# Patient Record
Sex: MALE | Race: WHITE | NOT HISPANIC OR LATINO | Employment: FULL TIME | URBAN - NONMETROPOLITAN AREA
[De-identification: names, ages, dates, MRNs, and addresses within clinical notes are randomized per-mention and may not be internally consistent; named-entity substitution may affect disease eponyms.]

---

## 2024-10-03 ENCOUNTER — APPOINTMENT (OUTPATIENT)
Dept: RADIOLOGY | Facility: HOSPITAL | Age: 34
End: 2024-10-03
Payer: COMMERCIAL

## 2024-10-03 ENCOUNTER — HOSPITAL ENCOUNTER (EMERGENCY)
Facility: HOSPITAL | Age: 34
Discharge: HOME | End: 2024-10-03
Payer: COMMERCIAL

## 2024-10-03 VITALS
WEIGHT: 315 LBS | OXYGEN SATURATION: 96 % | TEMPERATURE: 98.5 F | RESPIRATION RATE: 20 BRPM | HEIGHT: 76 IN | HEART RATE: 98 BPM | BODY MASS INDEX: 38.36 KG/M2 | SYSTOLIC BLOOD PRESSURE: 147 MMHG | DIASTOLIC BLOOD PRESSURE: 92 MMHG

## 2024-10-03 DIAGNOSIS — S96.919A MUSCLE STRAIN OF FOOT, INITIAL ENCOUNTER: ICD-10-CM

## 2024-10-03 DIAGNOSIS — S93.402A SPRAIN OF LEFT ANKLE, UNSPECIFIED LIGAMENT, INITIAL ENCOUNTER: Primary | ICD-10-CM

## 2024-10-03 PROCEDURE — 99284 EMERGENCY DEPT VISIT MOD MDM: CPT

## 2024-10-03 PROCEDURE — 73630 X-RAY EXAM OF FOOT: CPT | Mod: LEFT SIDE | Performed by: RADIOLOGY

## 2024-10-03 PROCEDURE — 73610 X-RAY EXAM OF ANKLE: CPT | Mod: LEFT SIDE | Performed by: RADIOLOGY

## 2024-10-03 PROCEDURE — 73630 X-RAY EXAM OF FOOT: CPT | Mod: LT

## 2024-10-03 PROCEDURE — 73610 X-RAY EXAM OF ANKLE: CPT | Mod: LT

## 2024-10-03 ASSESSMENT — PAIN DESCRIPTION - ORIENTATION: ORIENTATION: LEFT

## 2024-10-03 ASSESSMENT — PAIN DESCRIPTION - LOCATION: LOCATION: ANKLE

## 2024-10-03 ASSESSMENT — COLUMBIA-SUICIDE SEVERITY RATING SCALE - C-SSRS
2. HAVE YOU ACTUALLY HAD ANY THOUGHTS OF KILLING YOURSELF?: NO
1. IN THE PAST MONTH, HAVE YOU WISHED YOU WERE DEAD OR WISHED YOU COULD GO TO SLEEP AND NOT WAKE UP?: NO
6. HAVE YOU EVER DONE ANYTHING, STARTED TO DO ANYTHING, OR PREPARED TO DO ANYTHING TO END YOUR LIFE?: NO

## 2024-10-03 ASSESSMENT — PAIN SCALES - GENERAL
PAINLEVEL_OUTOF10: 2
PAINLEVEL_OUTOF10: 2

## 2024-10-03 ASSESSMENT — PAIN - FUNCTIONAL ASSESSMENT: PAIN_FUNCTIONAL_ASSESSMENT: 0-10

## 2024-10-03 NOTE — ED PROVIDER NOTES
Chief Complaint   Patient presents with    Ankle Injury     STATES HE STEPPED IN A HOLE JUST PTA AND TWISTED HIS LEFT ANKLE.         Patient History    History reviewed. No pertinent past medical history.   History reviewed. No pertinent surgical history.   No family history on file.   Social History     Social History Narrative    Not on file      No Known Allergies     PMH: Reviewed  PSH: Reviewed  Social History: Reviewed.   Allergies reviewed.     HPI: Raymundo Dhillon is a 33 y.o. male who presents to the ED today unaccompanied with complaints of left foot and ankle pain.  Just prior to arrival, the patient stepped into a hole and twisted his ankle.  Denies previous injury.  Nothing taken for pain prior to arrival.  Fell onto his knees but is not having knee pain.    PHYSICAL EXAM:    GENERAL: Vitals noted, no distress. Alert and oriented x 3. Non-toxic.       HEAD: Normocephalic, atraumatic.    NECK: Supple. No midline or paraspinal tenderness through full range of motion.      CARDIAC: Regular rate, rhythm. No murmurs or rubs.    RESPIRATORY: Lungs clear and equal bilaterally. No respiratory distress.     MUSCULOSKELETAL & SKIN:  Warm, dry, and intact. No rash/lesions.  Localized left ankle peripheral edema.  Distal cap refill less than 2 seconds.  Pedal pulse 2+ and bounding.  Pain with plantar and dorsiflexion.    NEURO: No focal neurologic deficits, acting appropriately.     Labs Reviewed - No data to display     XR foot left 3+ views   Final Result   No acute bony abnormalities are identified in the left ankle or foot   Signed by Abdulaziz Long MD      XR ankle left 3+ views   Final Result   No acute bony abnormalities are identified in the left ankle or foot   Signed by Abdulaziz Long MD           Medical Decision Making         ED COURSE: This patient was seen and examined by myself independently.  Patient declined pain medication here in the emergency department.  X-ray of the left foot and left ankle  ordered.  These are noted above showing no acute bony abnormalities identified.  Patient is reassured.  Ace wrap or Aircast recommended for treatment.  He declined crutches.  Recommended NSAIDs for pain control, he states he has Aleve at home.  Referred to orthopedics for follow-up care in 1 week.  Return to ED for any new or worsening symptoms.  Patient is discharged home in stable condition with computer instructions given.      DIAGNOSTIC IMPRESSION: #1 left ankle sprain #2 left foot strain     Antionette Bella, NEO-JOHN  10/03/24 2040